# Patient Record
(demographics unavailable — no encounter records)

---

## 2025-03-31 NOTE — HISTORY OF PRESENT ILLNESS
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 sleep apnea then  seven years ago  seen by PMD   dx asthma - albuterol then three years ago  symbicort  one inhalation  two inhalation am only - pets  dog x 2  three birds  one rabbit -ED visits   never  - vaccinations     4/14/2021  moderna     -never covid,  denies  pneumonia ,   no hospitalizations  - can ambulate 10 blocks slow  however  only one with  speed,   can  climb three flights - no chest pain  + thightness -chronic bronchitis x years -  last prednisone x 1  three months ago   felt better - cough with laughter   sputum then yellow  x  several times a day   -- all day --   no hemoptysis - expectorating thick white phlegm   8/2/2021 -asthma well controlled now   on spiriva and breo -albuterol  < 2 x week - at times  still with cough if laughing  or   mask bothers him   no chest pain no  hemoptysis   12/9/2021 -allergist   seasonal   +  dust  peanuts   pollen - skin prick +  -ENT deviated septum    on nasal sprays  fluticosone     -taking   spiriva and breo  doing well - now with cold weather  improved  albuterol   < 2 x times  -  no nocturnal   symptoms  11/10/2022 52y/o   male    allergist   asthma  -ran out breo   -took  medrol darrius  - high IgE reviewed peanut  allergy  -  seen by allergist  was to follow up ENT - not taking his singulair - no chest pain  - + wheezing -reviewed sleep options today as he is not tolerating cpap and feels nasal congstion bothers him    5/18/2023 53y/o  male   never smoker   occasional  marijuana  vaping -   allergic    asthma     now with dog   increase   symptoms  -trelegy   restarted due to this  -    dogs   shakes and  sheds- however does not want to remove - no chest pain no vomiting - stopped singulair  does not to be drowsy  - did not try anti histamines  8/7/2024 54y/o  male never smoker   occasional  marijuana  vaping allergic  asthma now with  2 dog    asthma - trelegy   200 mg po qd - albuterol  - last prednisone   one week ago  - covid in  7/2024 -feels like in [place]  asthma more controlled - discussed environment and new  injections  --refuses needles -   9/26/2024 54y/o male  h/o  vaping   asthma one dog now - was sick improved with steroids  now f/u  -blood work with + eosinophilia discussed again  -trelegy  200 qd -no cough no sob  -   3/3/1/2025 55y/o  male  h/o   vaping   asthma  one dog  fasenra   tolerated  - fasenra  -    - self d/c trelegy       due to improved  asthma -  cough at night   still  -now some   tired    ? URI   - [TextBox_22] : occ vaping  [Awakes with Dry Mouth] : does not awaken with dry mouth [Awakes with Headache] : does not awaken with headache [Daytime Somnolence] : denies daytime somnolence [Fatigue] : no fatigue [Recent  Weight Gain] : no recent weight gain [TextBox_100] : 6/21 [TextBox_108] : 12.7 [TextBox_112] : 4.8 [TextBox_116] : 78 [TextBox_120] : moderate when supine

## 2025-03-31 NOTE — ASSESSMENT
[FreeTextEntry1] : 53y/o male  1-oesinophilic   moderate persistent asthma  -  recurrent with   dog / vaping +   allergies likely  improved on fasenra 2- allergic rhinitis  3-   smoking marijuana occ 4-  6/26/2021   mild EDDIE moderate Supine  - not tolerated nasal pillow  5-allergy   + pollen  seasonal  Dr Zaid Bolton ENT  6- - vaccinations  covid + Moderna      + flu  refuses  pneumonia     Recommendations 1-  resume  trelegy  ? URI  vs   fasenra due to  take  3- Ventolin  MDI   encouraged use  4-  PFT 5- prednisone for emergencies 6- discussed to get  shingles  7- f/u  cxr next visit  Mr Moreno is improved  since he takes  his fasenra  and   doing well

## 2025-03-31 NOTE — REVIEW OF SYSTEMS
[Cough] : cough [Chest Tightness] : chest tightness [Wheezing] : wheezing [Seasonal Allergies] : seasonal allergies [Dizziness] : dizziness [Fever] : no fever [Fatigue] : no fatigue [Recent Wt Gain (___ Lbs)] : ~T no recent weight gain [Chills] : no chills [Poor Appetite] : no poor appetite [Recent Wt Loss (___ Lbs)] : ~T no recent weight loss [Ear Disturbance] : no ear disturbance [Epistaxis] : no epistaxis [Sore Throat] : no sore throat [Eye Irritation] : no eye irritation [Nasal Congestion] : no nasal congestion [Postnasal Drip] : no postnasal drip [Dry Mouth] : no dry mouth [Sinus Problems] : no sinus problems [Poor Dentition] : no poor dentition [Hemoptysis] : no hemoptysis [Frequent URIs] : no frequent URIs [Sputum] : no sputum [Dyspnea] : no dyspnea [Chest Discomfort] : no chest discomfort [Edema] : no edema [Syncope] : no syncope [Hay Fever] : no hay fever [GERD] : no gerd [Vomiting] : no vomiting [Diarrhea] : no diarrhea [Constipation] : no constipation [Dysphagia] : no dysphagia [Bleeding] : no bleeding [Frequency] : no dysuria [Urgency] : no frequency [Arthralgias] : no arthralgias [Myalgias] : no myalgias [Back Pain] : no back pain [Chronic Pain] : no chronic pain [Rash] : no rash [Ulcerations] : no ulcerations [Telangiectasias] : no telangiectasias [Anemia] : no anemia [Blood Transfusion] : no blood transfusion [Easy Bruising] : no easy bruising [History of Iron Deficiency] : no history of iron deficiency [Clotting Disorder/ Frequent bleeding] : no clotting disorder/ frequent bleeding [Headache] : no headache [Seizures] : no seizures [Memory Loss] : no memory loss [Depression] : no depression [Anxiety] : no anxiety [Panic Attacks] : no panic attacks [Diabetes] : no diabetes [Thyroid Problem] : no thyroid problem [Obesity] : no obesity

## 2025-03-31 NOTE — PHYSICAL EXAM
[IV] : Mallampati Class: IV [Normal Appearance] : normal appearance [Supple] : supple [No Neck Mass] : no neck mass [No JVD] : no jvd [Normal Rate/Rhythm] : normal rate/rhythm [Normal S1, S2] : normal s1, s2 [No Murmurs] : no murmurs [No Resp Distress] : no resp distress [No Acc Muscle Use] : no acc muscle use [Clear to Auscultation Bilaterally] : clear to auscultation bilaterally [Benign] : benign [Not Tender] : not tender [No Masses] : no masses [Soft] : soft [No Hernias] : no hernias [Normal Bowel Sounds] : normal bowel sounds [Normal Gait] : normal gait [No Clubbing] : no clubbing [No Edema] : no edema [No Rash] : no rash [No Motor Deficits] : no motor deficits [Normal Affect] : normal affect [TextBox_2] : pleasant male no distress no cough  [TextBox_11] : no lesion mosit

## 2025-07-21 NOTE — PROCEDURE
[FreeTextEntry1] : PFT    7/21/2025 good efforts Spirometry with    reduced  FVC  reduced  FEV1  (  63%)    reduced  FEV1/FVC  reduced  FEF 25-75% lung volumes by body plethysmography  reveal   reduced  TLC  68 %    dlco and dlco/va are normal   Impression  mixed    restrictive ventilatory defect and  obstructive airflow pattern

## 2025-07-21 NOTE — PHYSICAL EXAM
[IV] : Mallampati Class: IV [Supple] : supple [No JVD] : no jvd [Normal Rate/Rhythm] : normal rate/rhythm [Normal S1, S2] : normal s1, s2 [No Murmurs] : no murmurs [No Resp Distress] : no resp distress [No Acc Muscle Use] : no acc muscle use [Clear to Auscultation Bilaterally] : clear to auscultation bilaterally [Benign] : benign [Not Tender] : not tender [No Hernias] : no hernias [Normal Gait] : normal gait [No Clubbing] : no clubbing [No Edema] : no edema [No Rash] : no rash [No Focal Deficits] : no focal deficits [No Motor Deficits] : no motor deficits [Normal Affect] : normal affect [TextBox_2] : pleasant male no distress no cough no sob [TextBox_11] : crowded  no l esion moist

## 2025-07-21 NOTE — ASSESSMENT
[FreeTextEntry1] : 53y/o male  1-Eosinophilic   moderate persistent asthma  -    dog / vaping +   allergies likely  improved on fasenra 2- allergic rhinitis  3-   smoking marijuana occ 4-  6/26/2021   mild EDDIE moderate Supine  - not tolerated nasal pillow  5-allergy   + pollen  seasonal  Dr Zaid Bolton ENT  6- - vaccinations  covid + Moderna      + flu  refuses  pneumonia     Recommendations 1-   fasenra tolerated    improved       repeat  eos  blood work requested primary  3- Ventolin  MDI   encouraged use  4-  PFT  7/2026     5- prednisone for emergencies 6- discussed to get  shingles  7-   CT re-ordered due to  low  TLC  ? not sure accurate   Mr Moreno is improved  since he takes  his fasenra  and   doing well    less ED visits  Less prednisone use